# Patient Record
Sex: FEMALE | Race: ASIAN | NOT HISPANIC OR LATINO
[De-identification: names, ages, dates, MRNs, and addresses within clinical notes are randomized per-mention and may not be internally consistent; named-entity substitution may affect disease eponyms.]

---

## 2018-03-01 ENCOUNTER — RECORDS - HEALTHEAST (OUTPATIENT)
Dept: ADMINISTRATIVE | Facility: OTHER | Age: 23
End: 2018-03-01

## 2018-03-02 ENCOUNTER — COMMUNICATION - HEALTHEAST (OUTPATIENT)
Dept: ADMINISTRATIVE | Facility: CLINIC | Age: 23
End: 2018-03-02

## 2018-03-08 ENCOUNTER — AMBULATORY - HEALTHEAST (OUTPATIENT)
Dept: EDUCATION SERVICES | Facility: CLINIC | Age: 23
End: 2018-03-08

## 2018-03-08 DIAGNOSIS — O24.419 GESTATIONAL DIABETES MELLITUS (GDM), ANTEPARTUM, GESTATIONAL DIABETES METHOD OF CONTROL UNSPECIFIED: ICD-10-CM

## 2018-03-08 RX ORDER — GLUCOSAMINE HCL/CHONDROITIN SU 500-400 MG
1 CAPSULE ORAL 4 TIMES DAILY
Qty: 200 STRIP | Refills: 1 | Status: SHIPPED | OUTPATIENT
Start: 2018-03-08

## 2018-03-08 RX ORDER — SWAB
1 SWAB, NON-MEDICATED MISCELLANEOUS DAILY
Status: SHIPPED | COMMUNITY
Start: 2018-03-08

## 2018-03-15 ENCOUNTER — AMBULATORY - HEALTHEAST (OUTPATIENT)
Dept: EDUCATION SERVICES | Facility: CLINIC | Age: 23
End: 2018-03-15

## 2018-03-15 DIAGNOSIS — O24.410 DIET CONTROLLED GESTATIONAL DIABETES MELLITUS (GDM), ANTEPARTUM: ICD-10-CM

## 2018-04-04 ENCOUNTER — AMBULATORY - HEALTHEAST (OUTPATIENT)
Dept: EDUCATION SERVICES | Facility: CLINIC | Age: 23
End: 2018-04-04

## 2019-10-17 ENCOUNTER — COMMUNICATION - HEALTHEAST (OUTPATIENT)
Dept: HEALTH INFORMATION MANAGEMENT | Facility: CLINIC | Age: 24
End: 2019-10-17

## 2021-06-01 VITALS — WEIGHT: 141.3 LBS

## 2021-06-01 VITALS — WEIGHT: 140.9 LBS

## 2021-06-16 PROBLEM — O42.919 PRETERM PREMATURE RUPTURE OF MEMBRANES (PPROM) DELIVERED, CURRENT HOSPITALIZATION: Status: ACTIVE | Noted: 2018-03-19

## 2021-06-16 NOTE — PROGRESS NOTES
[facility] GDM Care Plan  Lola is here alone today for her follow-up.  Stated she is feeling pretty good.  She has had some nights when she has been up and down a lot and has not been feeling well.  She is eating 2-3 meals per day and all her recommended snacks.  She had 1 high post breakfast reading of 156 after having sticky rice with her meal.  All other post meal readings are within range.  Her fasting readings are good as well, she did have a 69 when waking one morning.  Stated she does not remember feeling different, but knows this was a morning after she did not sleep well.  Discussed goal of keeping FBG 80-<95 and she should have a small amount of fruit juice next time her reading is below 75.  She agreed.  All additional questions and concerns addressed.  Patient will follow-up in clinic in 3 weeks.  Discussed she should call before appointment if she has 3 high fasting readings in 7 days, 3 high unexplainable post meal readings in 7 days or 2 days of large/moderate ketones.  Patient agreed with plan.      Time: 30 minutes  Visit Type: GDM Individual Follow-up  Visit #: 2    Provider: Dr. FRANK Mendoza  Provider's Diagnosis (per referral form): Gestational (648.83)    Weight: 141 lb 4.8 oz (64.1 kg)  OGTT: 91/187/167/--  EDC: Estimated Date of Delivery: 5/14/18   Pregnancy #: 1  Previous GDM: No  Medications:   Current Outpatient Prescriptions:      acetone, urine, test Strp, Use 1 strip each morning to check ketones, Disp: 100 strip, Rfl: 1     aspirin 81 MG EC tablet, Take 81 mg by mouth daily., Disp: , Rfl:      blood glucose test (ONETOUCH VERIO) strips, Use 1 each As Directed 4 (four) times a day., Disp: 200 strip, Rfl: 1     lancets (ONETOUCH DELICA LANCETS) 30 gauge Misc, Use 1 each As Directed 4 (four) times a day., Disp: 100 each, Rfl: 1     prenatal vitamin iron-folic acid 27mg-0.8mg (PRENATAL S) 27 mg iron- 800 mcg Tab tablet, Take 1 tablet by mouth daily., Disp: , Rfl:      progesterone (PROMETRIUM) 100  MG capsule, Take 100 mg by mouth daily., Disp: , Rfl:   Supplements: No  PNV: Yes  BG monitoring goals: Fasting <95; 1 hour post start of meal <140. Test 4 x per day.  Check fasting a.m. ketones: Yes  GDM meal pattern/carb counting taught per guidelines: Yes    DIABETES CARE PLAN AND EDUCATION RECORD    Gestational Diabetes Disease Process/Preconception Care/Management During Pregnancy/Postpartum:Assessed and Discussed    Meter (per above goals): Assessed and Discussed    Nutrition Management    Weight: Assessed and Discussed  Portions/Balance: Assessed and discussed  Carb ID/Count: Assessed and discussed  Label Reading: Assessed and discussed  Menu Planning: Assessed and Discussed  Dining Out: Assessed and Discussed  Physical Activity: Assessed and Discussed  Medications: Assessed and Discussed    Acute Complications: Prevent, Detect, Treat:    Hypoglycemia: Assessed and Discussed  Hyperglycemia: Assessed and Discussed  Goal Setting and Problem Solving: Assessed and Discussed  Barriers: Assessed and Discussed  Psychosocial Adjustments: Assessed and Discussed

## 2021-06-19 NOTE — LETTER
Letter by Julieta Tran at      Author: Julieta Tran Service: -- Author Type: --    Filed:  Encounter Date: 10/17/2019 Status: Signed         2019       Lola Guerra  [address] [place] [address]    Dear Lola Guerra:    We are pleased to provide you with secure, online access to medical information for you and your family. Per your request, we have expanded your account to allow access to the records of the following family members:              Elsy TIJERINA Polo (privilege ends on 3/19/2030.)     How Do I Login?  1. In your Internet browser, go to [email or web address].  2. Click on Sign Up Now   3. Enter your ActiveCloud Activation Code exactly as it appears below. This code will  60 days after it is generated. You will not need to use this code after you have completed the sign-up process. If you do not sign up before the expiration date, you must request a new code.     ActiveCloud Activation Code: [email or web address]  Expires: 2019 10:20 AM    4. Enter in your date of birth and zip code.  5. Create a ActiveCloud username. Think of one that is secure and easy to remember.  Your username must be between 6 and 20 characters.  6. Create a ActiveCloud password. You can change your password at any time. Your password must be between 8 and 45 characters, contain at least two letters and one number, and contain both upper and lower case letters.  7. Choose a security question, enter your answer, and click Next. This can be used to access ActiveCloud if you forget your password.   8. Enter a valid e-mail address to receive e-mail notifications when new information is available in ActiveCloud.  9. Click Sign In.        How Do I Access a Family Member's Account?  10. Select the account you want to access by clicking the [place] with the appropriate patient's name at the top of your screen.   11. You will see a disclaimer page letting you know that you will be viewing a family member's  record. Review the disclaimer and then click Accept Proxy Access Disclaimer to proceed.  12. Once you switch to viewing a family member's record, you can navigate to Stand In pages the same way you would for yourself. You can return to your own account by clicking the [place] at the top of the screen with your name on it.    13. To customize colors and names of the linked accounts, you can select Personalize from the Profile dropdown menu at the top of the screen, then click the Edit button to make changes.      Additional Information  If you have questions, you can e-mail [employer] [email or web address] or call [phone] to talk to our [facility] Stand In staff. Remember, Stand In is NOT to be used for urgent needs. For medical emergencies, dial 911.

## 2021-06-19 NOTE — LETTER
Letter by Julieta Tran at      Author: Julieta Tran Service: -- Author Type: --    Filed:  Encounter Date: 10/17/2019 Status: Signed         2019       Lola Guerra  [address] [place] [address]    Dear Lola Guerra:    We are pleased to provide you with secure, online access to medical information for you and your family. Per your request, we have expanded your account to allow access to the records of the following family members:              Elsy TIJERINA Christ (privilege ends on 3/19/2030.)            Lala LARA Christ (privilege ends on 3/19/2030.)     How Do I Login?  1. In your Internet browser, go to [email or web address].  2. Click on Sign Up Now   3. Enter your [employer] Cloud Activation Code exactly as it appears below. This code will  60 days after it is generated. You will not need to use this code after you have completed the sign-up process. If you do not sign up before the expiration date, you must request a new code.     [employer] Cloud Activation Code: [email or web address]  Expires: 2019 10:20 AM    4. Enter in your date of birth and zip code.  5. Create a [employer] Cloud username. Think of one that is secure and easy to remember.  Your username must be between 6 and 20 characters.  6. Create a [employer] Cloud password. You can change your password at any time. Your password must be between 8 and 45 characters, contain at least two letters and one number, and contain both upper and lower case letters.  7. Choose a security question, enter your answer, and click Next. This can be used to access [employer] Cloud if you forget your password.   8. Enter a valid e-mail address to receive e-mail notifications when new information is available in [employer] Cloud.  9. Click Sign In.        How Do I Access a Family Member's Account?  10. Select the account you want to access by clicking the [place] with the appropriate patient's name at the top of your screen.   11. You will see a disclaimer page  letting you know that you will be viewing a family member's record. Review the disclaimer and then click Accept Proxy Access Disclaimer to proceed.  12. Once you switch to viewing a family member's record, you can navigate to Clearwave pages the same way you would for yourself. You can return to your own account by clicking the [place] at the top of the screen with your name on it.    13. To customize colors and names of the linked accounts, you can select Personalize from the Profile dropdown menu at the top of the screen, then click the Edit button to make changes.      Additional Information  If you have questions, you can e-mail [email or web address] or call [phone] to talk to our [facility] Clearwave staff. Remember, Clearwave is NOT to be used for urgent needs. For medical emergencies, dial 911.

## 2021-06-27 ENCOUNTER — HEALTH MAINTENANCE LETTER (OUTPATIENT)
Age: 26
End: 2021-06-27

## 2021-10-17 ENCOUNTER — HEALTH MAINTENANCE LETTER (OUTPATIENT)
Age: 26
End: 2021-10-17

## 2022-07-24 ENCOUNTER — HEALTH MAINTENANCE LETTER (OUTPATIENT)
Age: 27
End: 2022-07-24

## 2022-10-02 ENCOUNTER — HEALTH MAINTENANCE LETTER (OUTPATIENT)
Age: 27
End: 2022-10-02

## 2023-06-09 ENCOUNTER — APPOINTMENT (OUTPATIENT)
Dept: URBAN - METROPOLITAN AREA CLINIC 260 | Age: 28
Setting detail: DERMATOLOGY
End: 2023-06-09

## 2023-06-09 VITALS — WEIGHT: 135 LBS | HEIGHT: 59 IN

## 2023-06-09 DIAGNOSIS — D22 MELANOCYTIC NEVI: ICD-10-CM

## 2023-06-09 DIAGNOSIS — L81.0 POSTINFLAMMATORY HYPERPIGMENTATION: ICD-10-CM

## 2023-06-09 PROBLEM — D22.39 MELANOCYTIC NEVI OF OTHER PARTS OF FACE: Status: ACTIVE | Noted: 2023-06-09

## 2023-06-09 PROCEDURE — OTHER PHOTO-DOCUMENTATION: OTHER

## 2023-06-09 PROCEDURE — OTHER COUNSELING: OTHER

## 2023-06-09 PROCEDURE — 99212 OFFICE O/P EST SF 10 MIN: CPT

## 2023-06-09 PROCEDURE — OTHER MIPS QUALITY: OTHER

## 2023-06-09 PROCEDURE — OTHER ADDITIONAL NOTES: OTHER

## 2023-06-09 ASSESSMENT — LOCATION ZONE DERM
LOCATION ZONE: FACE
LOCATION ZONE: HAND

## 2023-06-09 ASSESSMENT — LOCATION DETAILED DESCRIPTION DERM
LOCATION DETAILED: RIGHT RADIAL DORSAL HAND
LOCATION DETAILED: GLABELLA

## 2023-06-09 ASSESSMENT — LOCATION SIMPLE DESCRIPTION DERM
LOCATION SIMPLE: RIGHT HAND
LOCATION SIMPLE: GLABELLA

## 2023-06-09 NOTE — PROCEDURE: COUNSELING
Detail Level: Detailed
Patient Specific Counseling (Will Not Stick From Patient To Patient): She does not recall trauma or excessive sun exposure or drinking fruit drinks in the sun

## 2023-06-09 NOTE — PROCEDURE: ADDITIONAL NOTES
Additional Notes: Discussed that this should fade. Offered prescription treatment but patient declines. Discussed that she can use an OTC retinol or vitamin e

## 2023-06-09 NOTE — PROCEDURE: ADDITIONAL NOTES
Additional Notes: Recommend excision with  or laser treatment at [facility] skin [facility] laser\\nShe will think about this and decide what she would like to do. Deferred photos and a tsk sent to [facility] today Additional Notes: Recommend excision with  or laser treatment at [facility] skin [facility] laser\\nShe will think about this and decide what she would like to do. Deferred photos and a tsk sent to [facility] today

## 2023-08-12 ENCOUNTER — HEALTH MAINTENANCE LETTER (OUTPATIENT)
Age: 28
End: 2023-08-12